# Patient Record
Sex: MALE | Race: WHITE | NOT HISPANIC OR LATINO | Employment: PART TIME | URBAN - METROPOLITAN AREA
[De-identification: names, ages, dates, MRNs, and addresses within clinical notes are randomized per-mention and may not be internally consistent; named-entity substitution may affect disease eponyms.]

---

## 2024-03-29 ENCOUNTER — OFFICE VISIT (OUTPATIENT)
Dept: OPHTHALMOLOGY | Facility: CLINIC | Age: 29
End: 2024-03-29
Payer: COMMERCIAL

## 2024-03-29 DIAGNOSIS — H52.13 MYOPIA OF BOTH EYES: Primary | ICD-10-CM

## 2024-03-29 PROCEDURE — FLVLG CONTACT LENS EVAL - LEVEL 2 (SP): Performed by: OPTOMETRIST

## 2024-03-29 PROCEDURE — 92004 COMPRE OPH EXAM NEW PT 1/>: CPT | Performed by: OPTOMETRIST

## 2024-03-29 PROCEDURE — 92015 DETERMINE REFRACTIVE STATE: CPT | Performed by: OPTOMETRIST

## 2024-03-29 ASSESSMENT — REFRACTION_WEARINGRX
OS_AXIS: 165
OS_SPHERE: -2.50
OD_AXIS: 010
OD_SPHERE: -2.25
OD_CYLINDER: -0.50
OS_CYLINDER: -0.50

## 2024-03-29 ASSESSMENT — VISUAL ACUITY
VA_OR_OD_CURRENT_RX: 20/20
OS_CC: 20/20
CORRECTION_TYPE: GLASSES
METHOD: SNELLEN - LINEAR
OD_CC: 20/20
VA_OR_OS_CURRENT_RX: 20/20

## 2024-03-29 ASSESSMENT — CONF VISUAL FIELD
OD_SUPERIOR_TEMPORAL_RESTRICTION: 0
OD_INFERIOR_TEMPORAL_RESTRICTION: 0
OD_INFERIOR_NASAL_RESTRICTION: 0
METHOD: COUNTING FINGERS
OS_NORMAL: 1
OS_INFERIOR_TEMPORAL_RESTRICTION: 0
OD_NORMAL: 1
OS_SUPERIOR_NASAL_RESTRICTION: 0
OS_SUPERIOR_TEMPORAL_RESTRICTION: 0
OD_SUPERIOR_NASAL_RESTRICTION: 0
OS_INFERIOR_NASAL_RESTRICTION: 0

## 2024-03-29 ASSESSMENT — TONOMETRY
OS_IOP_MMHG: 14
OD_IOP_MMHG: 13
IOP_METHOD: GOLDMANN APPLANATION

## 2024-03-29 ASSESSMENT — ENCOUNTER SYMPTOMS
ALLERGIC/IMMUNOLOGIC NEGATIVE: 0
GASTROINTESTINAL NEGATIVE: 0
ENDOCRINE NEGATIVE: 0
CARDIOVASCULAR NEGATIVE: 0
RESPIRATORY NEGATIVE: 0
HEMATOLOGIC/LYMPHATIC NEGATIVE: 0
NEUROLOGICAL NEGATIVE: 0
MUSCULOSKELETAL NEGATIVE: 0
EYES NEGATIVE: 0
CONSTITUTIONAL NEGATIVE: 0
PSYCHIATRIC NEGATIVE: 0

## 2024-03-29 ASSESSMENT — REFRACTION
OD_CYLINDER: -0.25
OD_SPHERE: -2.00
OS_CYLINDER: -0.25
OS_SPHERE: -2.00
OS_AXIS: 165
OD_AXIS: 010

## 2024-03-29 ASSESSMENT — REFRACTION_CURRENTRX
OD_BRAND: ULTRA
OD_SPHERE: -2.50
OD_BASECURVE: 8.5
OD_DIAMETER: 14.2
OS_DIAMETER: 14.2
OS_BRAND: ULTRA
OS_SPHERE: -2.50
OS_BASECURVE: 8.5
OS_CYLINDER: SPHERE
OD_CYLINDER: SPHERE

## 2024-03-29 ASSESSMENT — SLIT LAMP EXAM - LIDS
COMMENTS: NORMAL
COMMENTS: NORMAL

## 2024-03-29 ASSESSMENT — EXTERNAL EXAM - RIGHT EYE: OD_EXAM: NORMAL

## 2024-03-29 ASSESSMENT — EXTERNAL EXAM - LEFT EYE: OS_EXAM: NORMAL

## 2024-03-29 NOTE — PROGRESS NOTES
Assessment/Plan   Diagnoses and all orders for this visit:  Myopia of both eyes  New spec rx released today per patient request. Ocular health wnl for age OU. Monitor 1 year or sooner prn. Refraction billed today.  Discussed proper wear, care, replacement of contact lenses. Gave handout. D/c cl wear and RTC if eyes become red, painful, irritated. Monitor 1 year.   CL eval billed today. $55